# Patient Record
Sex: FEMALE | Race: WHITE | NOT HISPANIC OR LATINO | Employment: STUDENT | URBAN - METROPOLITAN AREA
[De-identification: names, ages, dates, MRNs, and addresses within clinical notes are randomized per-mention and may not be internally consistent; named-entity substitution may affect disease eponyms.]

---

## 2023-07-03 ENCOUNTER — OFFICE VISIT (OUTPATIENT)
Dept: PEDIATRICS | Facility: CLINIC | Age: 23
End: 2023-07-03
Payer: COMMERCIAL

## 2023-07-03 VITALS
HEIGHT: 68 IN | HEART RATE: 77 BPM | SYSTOLIC BLOOD PRESSURE: 116 MMHG | DIASTOLIC BLOOD PRESSURE: 77 MMHG | BODY MASS INDEX: 24.8 KG/M2 | WEIGHT: 163.6 LBS

## 2023-07-03 DIAGNOSIS — Z23 NEED FOR TDAP VACCINATION: Primary | ICD-10-CM

## 2023-07-03 DIAGNOSIS — D23.5 DERMOID CYST OF SKIN OF BACK: ICD-10-CM

## 2023-07-03 DIAGNOSIS — Z01.10 ENCOUNTER FOR HEARING EXAMINATION WITHOUT ABNORMAL FINDINGS: ICD-10-CM

## 2023-07-03 DIAGNOSIS — Z00.129 ENCOUNTER FOR ROUTINE CHILD HEALTH EXAMINATION WITHOUT ABNORMAL FINDINGS: ICD-10-CM

## 2023-07-03 PROBLEM — L85.8 KERATOSIS PILARIS: Status: ACTIVE | Noted: 2023-07-03

## 2023-07-03 PROBLEM — R42 POSITIONAL LIGHTHEADEDNESS: Status: RESOLVED | Noted: 2023-07-03 | Resolved: 2023-07-03

## 2023-07-03 PROCEDURE — 90715 TDAP VACCINE 7 YRS/> IM: CPT | Performed by: PEDIATRICS

## 2023-07-03 PROCEDURE — 92551 PURE TONE HEARING TEST AIR: CPT | Performed by: PEDIATRICS

## 2023-07-03 PROCEDURE — 3008F BODY MASS INDEX DOCD: CPT | Performed by: PEDIATRICS

## 2023-07-03 PROCEDURE — 1036F TOBACCO NON-USER: CPT | Performed by: PEDIATRICS

## 2023-07-03 PROCEDURE — 99395 PREV VISIT EST AGE 18-39: CPT | Performed by: PEDIATRICS

## 2023-07-03 PROCEDURE — 90471 IMMUNIZATION ADMIN: CPT | Performed by: PEDIATRICS

## 2023-07-03 PROCEDURE — 96127 BRIEF EMOTIONAL/BEHAV ASSMT: CPT | Performed by: PEDIATRICS

## 2023-07-03 ASSESSMENT — PATIENT HEALTH QUESTIONNAIRE - PHQ9
SUM OF ALL RESPONSES TO PHQ9 QUESTIONS 1 AND 2: 0
1. LITTLE INTEREST OR PLEASURE IN DOING THINGS: NOT AT ALL
2. FEELING DOWN, DEPRESSED OR HOPELESS: NOT AT ALL

## 2023-07-03 NOTE — PATIENT INSTRUCTIONS
Healthy 22 y.o. female child.  1. Anticipatory guidance discussed. Gave handout on well child issues at this age.  2. Vision done by eye doctor and hearing screen done  3. Vaccines as per orders if needed- TdaP  4. Follow-up visit in 1 year for routine exam with adult health care provider. Also start with gyn.   5. Likely cyst on back- see dermatology. Call if increasing in size, new symptoms  or changing. Family has dermatologist so will plan for appointment this summer.

## 2023-07-03 NOTE — PROGRESS NOTES
"Subjective   Amparo is a 22 y.o. female who presents today for her Health Maintenance and Supervision Exam.    General Health:  Amparo is in good health  Concerns today: spot on back- been there almost 1 year. It doesn't hurt but back does. No pain radiation. \"Just sore\"    Social and Family History:  At home, for summer, in house on campus  Parental support, work/family balance? Yes    Nutrition:  Current diet: balanced, yes dairy    Vitamins?supplements?no      Dental Care:  Amparo has a dental home: Yes  Dental hygiene regularly performed: Yes  Fluoridate water: Yes    Elimination:  Elimination patterns appropriate: Yes  Sleep:  Sleep patterns appropriate: Yes  Sleep problems: No    Behavior/Socialization:  Good relationships with parents and siblings? Yes  Supportive adult relationship? Yes  Permitted to make decisions? Yes  Responsibilities and chores? Yes  Family Meals? Yes  Normal peer relationships? 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  Graduates may. 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  Any educational accommodations:  No  Academically well adjusted: Yes  Performing at grade level: yes  Socially well adjusted:  yes    Activities:  Physical Activity: yes  Limited screen/media use:   Extracurricular Activities/Hobbies/Interests:     Sports Participation Screening:  Pre-sports participation survey questions assessed and passed? Yes    Menstrual Status:  Menarche at age :  Menses: regular  Problems: cramps- ibuprofen    Sexual History:  Dating: no  Sexually Active: no    Drugs:  Tobacco: No  Alcohol: on occ  Uses drugs: No  Mental Health:  Depression Screening: negative  Thoughts of self harm/suicide: No    Risk Assessment:  Additional health risks: no  Safety Assessment:  Safety topics reviewed: yes    Objective   Physical Exam  Gen: Patient is alert and in NAD.   HEENT: Head is NC/AT. PERRL. EOMI. No conjunctival injection present. Fundi are NL; " no esotropia or exotropia. TMs are transparent with good landmarks. Nasopharynx is without significant edema or rhinorrhea. Oropharynx is clear with MMM.   No tonsillar enlargement or exudates present. Good dentition.  Neck: supple; no lymphadenopathy or masses.  CV: RRR, NL S1/S2, no murmurs.    Resp: CTA bilaterally; no wheezes or rhonchi; work of breathing is NL.    Abdomen: soft, non-tender, non-distended; no HSM or masses; positive bowel sounds.   : NL female genitalia, Lito stage 5.   Musculoskeletal: Spine is straight; extremities are warm and dry with full ROM.     Neuro: NL gait, muscle tone, strength, and DTRs.     Skin: some  2 x 2 cm mass lower thoracic area just to left of midline. Nontender, mobile       Assessment/Plan   Healthy 22 y.o. female child.  1. Anticipatory guidance discussed. Gave handout on well child issues at this age.  2. Vision done by eye doctor and hearing screen done  3. Vaccines as per orders if needed- TdaP  4. Follow-up visit in 1 year for routine exam with adult health care provider. Also start with gyn.   5. Likely cyst on back- see dermatology. Call if increasing in size, new symptoms  or changing. Family has dermatologist so will plan for appointment this summer.

## 2024-07-09 ENCOUNTER — LAB (OUTPATIENT)
Dept: LAB | Facility: LAB | Age: 24
End: 2024-07-09
Payer: COMMERCIAL

## 2024-07-09 ENCOUNTER — APPOINTMENT (OUTPATIENT)
Dept: PRIMARY CARE | Facility: CLINIC | Age: 24
End: 2024-07-09
Payer: COMMERCIAL

## 2024-07-09 VITALS
BODY MASS INDEX: 26.07 KG/M2 | TEMPERATURE: 98.6 F | HEART RATE: 68 BPM | WEIGHT: 172 LBS | DIASTOLIC BLOOD PRESSURE: 85 MMHG | HEIGHT: 68 IN | RESPIRATION RATE: 12 BRPM | SYSTOLIC BLOOD PRESSURE: 121 MMHG | OXYGEN SATURATION: 98 %

## 2024-07-09 DIAGNOSIS — R53.83 FATIGUE, UNSPECIFIED TYPE: ICD-10-CM

## 2024-07-09 DIAGNOSIS — Z00.00 ENCOUNTER FOR WELLNESS EXAMINATION IN ADULT: Primary | ICD-10-CM

## 2024-07-09 DIAGNOSIS — Z00.00 ENCOUNTER FOR WELLNESS EXAMINATION IN ADULT: ICD-10-CM

## 2024-07-09 DIAGNOSIS — E66.3 OVERWEIGHT WITH BODY MASS INDEX (BMI) OF 26 TO 26.9 IN ADULT: ICD-10-CM

## 2024-07-09 DIAGNOSIS — D17.1 LIPOMA OF TORSO: ICD-10-CM

## 2024-07-09 PROCEDURE — 85027 COMPLETE CBC AUTOMATED: CPT

## 2024-07-09 PROCEDURE — 36415 COLL VENOUS BLD VENIPUNCTURE: CPT

## 2024-07-09 PROCEDURE — 83036 HEMOGLOBIN GLYCOSYLATED A1C: CPT

## 2024-07-09 PROCEDURE — 3008F BODY MASS INDEX DOCD: CPT | Performed by: FAMILY MEDICINE

## 2024-07-09 PROCEDURE — 82607 VITAMIN B-12: CPT

## 2024-07-09 PROCEDURE — 99203 OFFICE O/P NEW LOW 30 MIN: CPT | Performed by: FAMILY MEDICINE

## 2024-07-09 PROCEDURE — 82306 VITAMIN D 25 HYDROXY: CPT

## 2024-07-09 PROCEDURE — 80053 COMPREHEN METABOLIC PANEL: CPT

## 2024-07-09 PROCEDURE — 1036F TOBACCO NON-USER: CPT | Performed by: FAMILY MEDICINE

## 2024-07-09 PROCEDURE — 84443 ASSAY THYROID STIM HORMONE: CPT

## 2024-07-09 PROCEDURE — 99385 PREV VISIT NEW AGE 18-39: CPT | Performed by: FAMILY MEDICINE

## 2024-07-09 ASSESSMENT — PATIENT HEALTH QUESTIONNAIRE - PHQ9
10. IF YOU CHECKED OFF ANY PROBLEMS, HOW DIFFICULT HAVE THESE PROBLEMS MADE IT FOR YOU TO DO YOUR WORK, TAKE CARE OF THINGS AT HOME, OR GET ALONG WITH OTHER PEOPLE: SOMEWHAT DIFFICULT
2. FEELING DOWN, DEPRESSED OR HOPELESS: SEVERAL DAYS
SUM OF ALL RESPONSES TO PHQ9 QUESTIONS 1 AND 2: 2
1. LITTLE INTEREST OR PLEASURE IN DOING THINGS: SEVERAL DAYS

## 2024-07-09 ASSESSMENT — ENCOUNTER SYMPTOMS
BACK PAIN: 1
UNEXPECTED WEIGHT CHANGE: 1
FATIGUE: 1

## 2024-07-09 NOTE — PROGRESS NOTES
"Subjective   Patient ID: Amparo Ovalle is a 23 y.o. female who presents for an establishing visit.    HPI   Fatigue has been intermittent. Mom has hashimoto's and maternal gma has MS.  Taking MV  Normal appetite  She is keeping routine  Work-ophthalmology tech  No issues with Bms  +stress  Had low iron when younger-heavy menstrual cycles. Eats meat.      Review of Systems   Constitutional:  Positive for fatigue.   Musculoskeletal:  Positive for back pain.   All other systems reviewed and are negative.    Objective   /85 (BP Location: Right arm, Patient Position: Sitting, BP Cuff Size: Adult)   Pulse 68   Temp 37 °C (98.6 °F) (Temporal)   Resp 12   Ht 1.721 m (5' 7.75\")   Wt 78 kg (172 lb)   LMP 07/04/2024   SpO2 98%   BMI 26.35 kg/m²     Physical Exam  Constitutional: Well developed, well nourished, alert and in no acute distress.  Head and Face: Normocephalic, atraumatic.  Eyes: Normal external exam. Pupils equally round and reactive to light with normal accommodation and extraocular movements intact.   ENT: External inspection of ears normal, tympanic membranes visualized and normal. Nasal mucosa, septum, and turbinates normal. Oral mucosa moist, oropharynx clear.   Neck: Supple, no lymphadenopathy or masses. Thyroid not enlarged, no palpable nodules.   Cardiovascular: Regular rate and rhythm, normal S1 and S2, no murmurs, gallops, or rubs. Radial pulses normal. No peripheral edema.   Pulmonary: No respiratory distress, lungs clear to auscultation bilaterally. No wheezes, rhonchi, rales.   Abdomen: Soft, nontender, nondistended, normal bowel sounds. No masses palpated.   Musculoskeletal: Gait normal. Muscle strength/tone normal of all 4 extremities. Normal range of motion of all extremities.   Skin: Warm, well perfused, normal skin turgor and color, moderate soft, fixed lipoma lateral to left side of T11 with mild TTP.   Neurologic: Cranial nerves II-XII grossly intact. Deep tendon reflexes were 2+ " and symmetric. Sensation normal bilaterally.   Psychiatric: Mood calm and affect normal.    Assessment/Plan   Recommendations for women annual wellness exam:   Make sure screenings for cervical and breast cancer are up to date if applicable- pap smears age 43-65-tphkxfbdi with GYN.   Discuss mammogram starting at age 40 or sooner if positive family history of breast cancer   STD screening   Follow a healthy diet (Dash diet, Mediterranean diet)  Exercise 150 min/wk   Maintain healthy weight (BMI < 25)-your BMI is 26.   Do not smoke   Alcohol in moderation (up to 1 drink/day)  Get enough sleep (7-8 hours/night)  Take a prenatal vitamin with folic acid if possibility of pregnancy   Make sure immunizations are up to date (influenza, Tdap)-up to date.  Premenopausal women need minimum 1,000 mg calcium and 600-800 IU vitamin D daily (combination of diet + supplement)  Talk to your physician if you have concerns about depression or anxiety  Visit dentist twice yearly  Colon Cancer Screening-n/a    Referral to Dr.Linz quispe (general surgeon) for lipoma evaluation    Non-fasting labs ordered to assess fatigue  Please keep routine throughout the day and keep a similar sleep schedule  Drink plenty of water and follow a healthy diet  Exercise as tolerate 5-6 days/week      Follow up in 1 year for CPE or sooner if needed

## 2024-07-09 NOTE — PROGRESS NOTES
"Subjective   Patient ID: Amparo Ovalle is a 23 y.o. female who presents for Annual Exam.    HPI   Fatigue  Fhx of thyroid issues  +weight gain - 20 pounds in the last year  Just graduated college  Still feeling fatigued after sleeping 8 hours, sleeping through the night, falls asleep with no issues    Lipoma  L mid back  Pediatrician saw it first, referred to derm and they wouldn't take it out because it's too close to spine  Reports it may be causing back pain  Noticed it 1.5 year ago  Denies growth in size    Diet: dairy free, well balanced  Exercise: walk/run around neighborhood  S/V: MV  Caffeine: 1 cup coffee day  Alcohol: occasional in school, not currently  Sleep: see above  Tobacco/Nicotine: no  Pap: last July  Menstrual cycles: regular  Contraception: none  Dentist: routine  Eye: regular, next month      Review of Systems   Constitutional:  Positive for fatigue and unexpected weight change.   All other systems reviewed and are negative.      Objective   /85 (BP Location: Right arm, Patient Position: Sitting, BP Cuff Size: Adult)   Pulse 68   Temp 37 °C (98.6 °F) (Temporal)   Resp 12   Ht 1.721 m (5' 7.75\")   Wt 78 kg (172 lb)   LMP 07/04/2024   SpO2 98%   BMI 26.35 kg/m²     Physical Exam  HENT:      Right Ear: Tympanic membrane, ear canal and external ear normal.      Left Ear: Tympanic membrane, ear canal and external ear normal.      Mouth/Throat:      Mouth: Mucous membranes are moist.   Cardiovascular:      Pulses: Normal pulses.      Heart sounds: Normal heart sounds.   Pulmonary:      Effort: Pulmonary effort is normal.      Breath sounds: Normal breath sounds.   Abdominal:      General: Bowel sounds are normal.   Neurological:      Mental Status: She is alert.   Psychiatric:         Mood and Affect: Mood normal.         Behavior: Behavior normal.         Assessment/Plan          "

## 2024-07-10 LAB
25(OH)D3 SERPL-MCNC: 31 NG/ML (ref 30–100)
ALBUMIN SERPL BCP-MCNC: 4.7 G/DL (ref 3.4–5)
ALP SERPL-CCNC: 53 U/L (ref 33–110)
ALT SERPL W P-5'-P-CCNC: 18 U/L (ref 7–45)
ANION GAP SERPL CALC-SCNC: 12 MMOL/L (ref 10–20)
AST SERPL W P-5'-P-CCNC: 20 U/L (ref 9–39)
BILIRUB SERPL-MCNC: 0.2 MG/DL (ref 0–1.2)
BUN SERPL-MCNC: 15 MG/DL (ref 6–23)
CALCIUM SERPL-MCNC: 9.9 MG/DL (ref 8.6–10.6)
CHLORIDE SERPL-SCNC: 101 MMOL/L (ref 98–107)
CO2 SERPL-SCNC: 29 MMOL/L (ref 21–32)
CREAT SERPL-MCNC: 0.64 MG/DL (ref 0.5–1.05)
EGFRCR SERPLBLD CKD-EPI 2021: >90 ML/MIN/1.73M*2
ERYTHROCYTE [DISTWIDTH] IN BLOOD BY AUTOMATED COUNT: 12.2 % (ref 11.5–14.5)
EST. AVERAGE GLUCOSE BLD GHB EST-MCNC: 94 MG/DL
GLUCOSE SERPL-MCNC: 89 MG/DL (ref 74–99)
HBA1C MFR BLD: 4.9 %
HCT VFR BLD AUTO: 37.7 % (ref 36–46)
HGB BLD-MCNC: 12.4 G/DL (ref 12–16)
MCH RBC QN AUTO: 28.4 PG (ref 26–34)
MCHC RBC AUTO-ENTMCNC: 32.9 G/DL (ref 32–36)
MCV RBC AUTO: 86 FL (ref 80–100)
NRBC BLD-RTO: 0 /100 WBCS (ref 0–0)
PLATELET # BLD AUTO: 289 X10*3/UL (ref 150–450)
POTASSIUM SERPL-SCNC: 4.1 MMOL/L (ref 3.5–5.3)
PROT SERPL-MCNC: 6.9 G/DL (ref 6.4–8.2)
RBC # BLD AUTO: 4.37 X10*6/UL (ref 4–5.2)
SODIUM SERPL-SCNC: 138 MMOL/L (ref 136–145)
TSH SERPL-ACNC: 1.99 MIU/L (ref 0.44–3.98)
VIT B12 SERPL-MCNC: 745 PG/ML (ref 211–911)
WBC # BLD AUTO: 7.2 X10*3/UL (ref 4.4–11.3)

## 2024-07-10 NOTE — RESULT ENCOUNTER NOTE
Kidney function, glucose, electrolytes, and liver function are normal.    Normal B12 level    Thyroid function normal    Normal vitamin D    Normal A1c, no diabetes    Blood counts are normal including white blood cells and platelets and there is no anemia.

## 2024-07-16 NOTE — PROGRESS NOTES
"History Of Present Illness :  Amparo Ovalle is a healthy 23 y.o. female who presents on referral from Dr. Henny Georges for evaluation of a soft tissue mass.  The patient was recently seen by Dr. Georges on 7/9/2024 to establish care.  examination at that time revealed: \"Skin: Warm, well perfused, normal skin turgor and color, moderate soft, fixed lipoma lateral to left side of T11 with mild TTP.\"    The patient notes a 2-year history of a small subcutaneous soft tissue mass just to the left of the mid spine.  She first noticed this on self-exam.  She notes some mild associated soreness.  There is been only some minimal enlargement over time.  This has been noticed by previous physicians including a dermatologist and her previous pediatrician.  She has had no similar symptoms or lesions in the past.    The patient presents with her mother, Francesca.  She lives with her parents in [place].  She is a recent premed graduate from the [employer].  She is taking a gap year before she applies to med school.  She is currently working as a tech in an ophthalmology office.    Past Medical History   Past Medical History:   Diagnosis Date    Personal history of other diseases of the respiratory system 03/23/2015    History of streptococcal pharyngitis        Surgical History    Past Surgical History:   Procedure Laterality Date    WISDOM TOOTH EXTRACTION  August 2018        Allergies   Allergies   Allergen Reactions    Lactose Hives        Home Meds  No current outpatient medications     Family History    Family History   Problem Relation Name Age of Onset    Hyperlipidemia Mother Francesca     Hypertension Mother Francesca     Asthma Father Carlos     Heart disease Maternal Grandfather Bruno     Hyperlipidemia Maternal Grandfather Bruno     Hypertension Maternal Grandfather Bruno     Stroke Maternal Grandfather Bruno     COPD Paternal Grandfather Carlos     Heart disease Paternal Grandfather Carlos     Asthma Sister Sandra     Asthma Brother " Delonte     Asthma Brother Anupam         Social History  Social History     Tobacco Use    Smoking status: Never     Passive exposure: Never    Smokeless tobacco: Never   Substance Use Topics    Alcohol use: Not Currently    Drug use: Never        Review Of Systems    Review of Systems    General: Not Present- Obesity, Cancer, HIV, MRSA, Recent Cold/Flu, Tired During the Day and VRE.  HEENT: Not Present- Migraine, Cataracts, Glaucoma, Macular Degeneration and Retinal Detachment.  Respiratory: Not Present- Asthma, Chronic Cough, Difficulty Breathing on Exertion, Difficulty Breathing at Rest, Emphysema, Frequent Bronchitis, Home CPAP/BiPAP, Home Oxygen, Pulmonary Embolus, Pneumonia/TB, Sleep Apnea and Snoring.  Cardiovascular: Not Present- Chest Pain, Congestive Heart Failure, Heart Attack, Coronary Artery Disease, Heart Stent, High Cholesterol/Lipids, Hypertension, Internal Defibrillator, Irregular Heart Beat, Mitral Valve Prolapse, Murmur, Pacemaker and Peripheral Vascular Disease.  Gastrointestinal: Not Present- Heartburn, Hepatitis, Hiatal Hernia, Jaundice, Reflux, Stomach Ulcer and IBS.  Female Genitourinary: Not Present- Kidney Failure, Kidney Stones, Dialysis and Urinary Tract Infection.  Musculoskeletal: Not Present- Arthritis, Back Pain and Fibromyalgia.  Neurological: Not Present- Headaches, Numbness, Tingling, Seizures, Stroke,  Shunt and Weakness.  Psychiatric: Not Present- Anxiety, Bipolar, Depression and Panic Attacks.  Endocrine: Not Present- Diabetes, Hyperthyroidism, Hypothyroidism and Low Blood Sugar.  Hematology: Not Present- Abnormal Bleeding, Anemia and Blood Clots.    Vitals  There were no vitals taken for this visit.     Physical Exam   Skin & Soft Tissue Exam    Integumentary  Global Assessment: Examination of related systems reveals - Well-developed, well-nourished and in no acute distress; alert and oriented x 3,  Neck supple, with no palpable masses, no thyromegaly, No lymphadenopathy  and Apocrine and  eccrine glands are normal with no hyperhidrosis.   Upon inspection and palpation of skin surfaces of the - Head/Face: no rashes, ulcers, lesions or evidence of photo damage. No palpable nodules or masses, Neck: no visible lesions or palpable masses, Chest: no swelling,scarring or lesions. No prominent arteries or veins observed, Axillae: non-tender, no inflammation or ulceration, no drainage., Abdomen: no scars, rashes or lesions, Back: normal skin surface, no evidence of  photo damage, no suspicious lesions, Right upper extremity: no lesions or rashes. No evidence of photo damage, Left upper extremity: no lesions or rashes. No evidence of photo damage, Right lower extremity: no stasis ulcers, rashes or suspicious lesions. No evidence of photo damage, Left lower extremity: no stasis ulcers, rashes or suspicious lesions. No evidence of  photo damage, Distribution of scalp and body hair is normal and No dandruff or other scalp lesions noted.    Back: The patient was examined upright and in the prone position; just to the left of the spine at roughly T11/12, there is a transversely oriented relatively well-defined 2.5 x 1.5 cm  nontender subcutaneous mass consistent with lipoma.    Chest and Lung Exam  Chest and lung exam reveals - normal excursion with symmetric chest walls and on auscultation, normal breath sounds, no  adventitious sounds and normal vocal resonance.    Cardiovascular  Cardiovascular examination reveals - normal heart sounds, regular rate and rhythm with no murmurs.    Assessment/Plan   Ms. Ovalle has a small soft tissue mass just to the left of the spine at T11/12 consistent with a subcutaneous lipoma.    Recommendations:    Options were explained to the patient including expectant management versus excision.  Indications for excision typically include unclear diagnosis, enlargement, or symptoms.    Excision will be under local anesthesia as an outpatient at [facility].  The CPT code  would be 34687.    The patient will consider, and let us know if she would like to proceed.    If this is to be followed expectantly, the patient return if there is any change in size or character.

## 2024-07-17 ENCOUNTER — APPOINTMENT (OUTPATIENT)
Dept: SURGERY | Facility: CLINIC | Age: 24
End: 2024-07-17
Payer: COMMERCIAL

## 2024-07-17 VITALS
TEMPERATURE: 98.4 F | SYSTOLIC BLOOD PRESSURE: 136 MMHG | HEIGHT: 68 IN | OXYGEN SATURATION: 96 % | BODY MASS INDEX: 26.49 KG/M2 | WEIGHT: 174.8 LBS | RESPIRATION RATE: 16 BRPM | DIASTOLIC BLOOD PRESSURE: 96 MMHG | HEART RATE: 82 BPM

## 2024-07-17 DIAGNOSIS — D17.1 LIPOMA OF TORSO: ICD-10-CM

## 2024-07-17 PROCEDURE — 99203 OFFICE O/P NEW LOW 30 MIN: CPT | Performed by: SURGERY

## 2024-07-17 PROCEDURE — 3008F BODY MASS INDEX DOCD: CPT | Performed by: SURGERY

## 2024-07-17 PROCEDURE — 1036F TOBACCO NON-USER: CPT | Performed by: SURGERY

## 2024-07-17 ASSESSMENT — PAIN SCALES - GENERAL: PAINLEVEL: 0-NO PAIN

## 2024-08-08 ENCOUNTER — PREP FOR PROCEDURE (OUTPATIENT)
Dept: SURGERY | Facility: HOSPITAL | Age: 24
End: 2024-08-08
Payer: COMMERCIAL

## 2024-08-08 DIAGNOSIS — R22.9 SUBCUTANEOUS MASS: Primary | ICD-10-CM

## 2024-08-23 ENCOUNTER — HOSPITAL ENCOUNTER (OUTPATIENT)
Facility: HOSPITAL | Age: 24
Setting detail: OUTPATIENT SURGERY
Discharge: HOME | End: 2024-08-23
Attending: SURGERY | Admitting: SURGERY
Payer: COMMERCIAL

## 2024-08-23 VITALS
HEART RATE: 85 BPM | OXYGEN SATURATION: 100 % | HEIGHT: 67 IN | RESPIRATION RATE: 16 BRPM | BODY MASS INDEX: 27.44 KG/M2 | WEIGHT: 174.82 LBS | SYSTOLIC BLOOD PRESSURE: 121 MMHG | TEMPERATURE: 98.2 F | DIASTOLIC BLOOD PRESSURE: 78 MMHG

## 2024-08-23 DIAGNOSIS — R22.9 SUBCUTANEOUS MASS: Primary | ICD-10-CM

## 2024-08-23 PROCEDURE — 88304 TISSUE EXAM BY PATHOLOGIST: CPT | Mod: TC,PARLAB | Performed by: SURGERY

## 2024-08-23 PROCEDURE — 3600000003 HC OR TIME - INITIAL BASE CHARGE - PROCEDURE LEVEL THREE: Performed by: SURGERY

## 2024-08-23 PROCEDURE — 2500000004 HC RX 250 GENERAL PHARMACY W/ HCPCS (ALT 636 FOR OP/ED): Mod: JZ | Performed by: SURGERY

## 2024-08-23 PROCEDURE — 7100000010 HC PHASE TWO TIME - EACH INCREMENTAL 1 MINUTE: Performed by: SURGERY

## 2024-08-23 PROCEDURE — 3600000008 HC OR TIME - EACH INCREMENTAL 1 MINUTE - PROCEDURE LEVEL THREE: Performed by: SURGERY

## 2024-08-23 PROCEDURE — 2500000005 HC RX 250 GENERAL PHARMACY W/O HCPCS: Performed by: SURGERY

## 2024-08-23 PROCEDURE — 2720000007 HC OR 272 NO HCPCS: Performed by: SURGERY

## 2024-08-23 PROCEDURE — 7100000009 HC PHASE TWO TIME - INITIAL BASE CHARGE: Performed by: SURGERY

## 2024-08-23 PROCEDURE — 21931 EXC BACK LES SC 3 CM/>: CPT | Performed by: SURGERY

## 2024-08-23 RX ORDER — BUPIVACAINE HYDROCHLORIDE 5 MG/ML
INJECTION, SOLUTION EPIDURAL; INTRACAUDAL AS NEEDED
Status: DISCONTINUED | OUTPATIENT
Start: 2024-08-23 | End: 2024-08-23 | Stop reason: HOSPADM

## 2024-08-23 RX ORDER — LIDOCAINE HYDROCHLORIDE 10 MG/ML
INJECTION INFILTRATION; PERINEURAL AS NEEDED
Status: DISCONTINUED | OUTPATIENT
Start: 2024-08-23 | End: 2024-08-23 | Stop reason: HOSPADM

## 2024-08-23 ASSESSMENT — PAIN - FUNCTIONAL ASSESSMENT: PAIN_FUNCTIONAL_ASSESSMENT: 0-10

## 2024-08-23 ASSESSMENT — PAIN SCALES - GENERAL: PAINLEVEL_OUTOF10: 0 - NO PAIN

## 2024-08-23 NOTE — OP NOTE
LEFT MID BACK SUBCUTANEOUS MASS EXCISION (L) Operative Note     Date: 2024  OR Location: [facility]    Name: Amparo Ovalle, : 2000, Age: 23 y.o., MRN: [id number], Sex: female    Diagnosis  Pre-op Diagnosis      * Subcutaneous mass [R22.9] Post-op Diagnosis     * Subcutaneous mass [R22.9]     Procedures    Excision of subcutaneous mass, back  (> 3 cm, CPT 49755)      Surgeons      * Yon Jansen - Primary    Resident/Fellow/Other Assistant:  GENESIS Guzman    Procedure Summary  Anesthesia: Local with a total of 23 cc of a 50-50 mixture of 1% lidocaine and 0.5% Marcaine with epinephrine    ASA: ASA status not filed in the log.  Anesthesia Staff: No anesthesia staff entered.  Estimated Blood Loss: Less than 2 mL  Intra-op Medications: * Intraprocedure medication information is unavailable because the case start and end events have not been set *      Intraprocedure I/O Totals       None           Specimen:   ID Type Source Tests Collected by Time   1 : left lower back soft tissue mass Tissue SOFT TISSUE MASS RESECTION SURGICAL PATHOLOGY EXAM Yon Jansen MD 2024 1332        Staff:   Relief Circulator: Mary Beth Nassar Scrub: Daphne  Scrub Person: Elvin  Circulator: Varsha  Circulator: Omar         Drains and/or Catheters: * None in log *    Tourniquet Times:         Implants:     Findings: See below    Indications: Amparo Ovalle is an 23 y.o. female who is having surgery for Subcutaneous mass [R22.9].     The patient was seen in the preoperative area. The risks, benefits, complications, treatment options, non-operative alternatives, expected recovery and outcomes were discussed with the patient. The possibilities of reaction to medication, pulmonary aspiration, injury to surrounding structures, bleeding, recurrent infection, the need for additional procedures, failure to diagnose a condition, and creating a complication requiring transfusion or operation were discussed with the patient. The patient  concurred with the proposed plan, giving informed consent.  The site of surgery was properly noted/marked if necessary per policy. The patient has been actively warmed in preoperative area. Preoperative antibiotics are not indicated. Venous thrombosis prophylaxis is not indicated.    Procedure Details:     Findings:   In the left lower back, mid scapular line, at the level roughly T12, there was a subcutaneous transversely oriented deep subcutaneous mass; this was unilobular and fatty and encapsulated measured 36 x 34 x 8 mm.    Specimens:  Subcutaneous mass, left lower back    Procedure:    The patient was taken to the operating room placed on the table in the prone position.  Preoperative huddle was accomplished with the OR team and the patient.  The left lower was prepared and draped in normal sterile fashion. The surgical site was marked by the surgeon preoperatively. After the appropriate timeout, the case commenced.    A 4 cm transverse incision was marked directly over the mass. The skin and subcutaneous tissues were anesthetized using the above-noted local infiltrative anesthesia. The skin and SQ tissues were divided down to the mass. The fatty mass was then grasped, elevated, and circumferentially and sharply dissected free, removed in toto, and sent to pathology as a specimen. Hemostasis was obtained using direct pressure and minimal electrocautery. The subcutaneous tissues were then approximated using interrupted 3-0 Vicryl sutures. The skin was then approximated using a running 4-0 undyed subcuticular Vicryl suture. The wound was cleaned and dry, benzoin and Steri-Strips were placed, followed by a dry sterile bio-occlusive dressing.    The patient tolerated the procedure well. Sponge, needle, and instrument counts were correct times 2.  EBL was < 2 cc.  The patient was taken to the post-operative holding area with stable vital signs and in excellent condition.    Yon Jansen M.D.  Complications:  None;  patient tolerated the procedure well.    Disposition: PACU - hemodynamically stable.  Condition: stable         Additional Details: none    Attending Attestation: I performed the procedure.    Yon Jansen  Phone Number: [phone]

## 2024-08-23 NOTE — H&P
"Preoperative History and Physical     History Of Present Illness    The patient presents for her operation.  She was seen by me on 7/17/2024.  Please see that note for details.  She has noted no change in the size or character of the lesion since her initial visit.  She has experienced no new medical problems.    7/17/2024:  Amparo Ovalle is a healthy 23 y.o. female who presents on referral from Dr. Henny Georges for evaluation of a soft tissue mass.  The patient was recently seen by Dr. Georges on 7/9/2024 to establish care.  examination at that time revealed: \"Skin: Warm, well perfused, normal skin turgor and color, moderate soft, fixed lipoma lateral to left side of T11 with mild TTP.\"     The patient notes a 2-year history of a small subcutaneous soft tissue mass just to the left of the mid spine.  She first noticed this on self-exam.  She notes some mild associated soreness.  There is been only some minimal enlargement over time.  This has been noticed by previous physicians including a dermatologist and her previous pediatrician.  She has had no similar symptoms or lesions in the past.     The patient presents with her mother, Francesca.  She lives with her parents in [place].  She is a recent premed graduate from the [employer].  She is taking a gap year before she applies to med school.  She is currently working as a tech in an ophthalmology office.     Past Medical History   Medical History        Past Medical History:   Diagnosis Date    Personal history of other diseases of the respiratory system 03/23/2015     History of streptococcal pharyngitis            Surgical History    Surgical History         Past Surgical History:   Procedure Laterality Date    WISDOM TOOTH EXTRACTION   August 2018            Allergies   RX Allergies        Allergies   Allergen Reactions    Lactose Hives            Home Meds  No current outpatient medications      Family History    Family History          Family History "   Problem Relation Name Age of Onset    Hyperlipidemia Mother Francesca      Hypertension Mother Francesca      Asthma Father Carlos      Heart disease Maternal Grandfather Bruno      Hyperlipidemia Maternal Grandfather Bruno      Hypertension Maternal Grandfather Bruno      Stroke Maternal Grandfather Bruno      COPD Paternal Grandfather Carlos      Heart disease Paternal Grandfather Carlos      Asthma Sister Sandra      Asthma Brother Delonte      Asthma Brother Anupam              Social History  Social History   Social History            Tobacco Use    Smoking status: Never       Passive exposure: Never    Smokeless tobacco: Never   Substance Use Topics    Alcohol use: Not Currently    Drug use: Never            Review Of Systems    Review of Systems     General: Not Present- Obesity, Cancer, HIV, MRSA, Recent Cold/Flu, Tired During the Day and VRE.  HEENT: Not Present- Migraine, Cataracts, Glaucoma, Macular Degeneration and Retinal Detachment.  Respiratory: Not Present- Asthma, Chronic Cough, Difficulty Breathing on Exertion, Difficulty Breathing at Rest, Emphysema, Frequent Bronchitis, Home CPAP/BiPAP, Home Oxygen, Pulmonary Embolus, Pneumonia/TB, Sleep Apnea and Snoring.  Cardiovascular: Not Present- Chest Pain, Congestive Heart Failure, Heart Attack, Coronary Artery Disease, Heart Stent, High Cholesterol/Lipids, Hypertension, Internal Defibrillator, Irregular Heart Beat, Mitral Valve Prolapse, Murmur, Pacemaker and Peripheral Vascular Disease.  Gastrointestinal: Not Present- Heartburn, Hepatitis, Hiatal Hernia, Jaundice, Reflux, Stomach Ulcer and IBS.  Female Genitourinary: Not Present- Kidney Failure, Kidney Stones, Dialysis and Urinary Tract Infection.  Musculoskeletal: Not Present- Arthritis, Back Pain and Fibromyalgia.  Neurological: Not Present- Headaches, Numbness, Tingling, Seizures, Stroke,  Shunt and Weakness.  Psychiatric: Not Present- Anxiety, Bipolar, Depression and Panic Attacks.  Endocrine: Not Present- Diabetes,  Hyperthyroidism, Hypothyroidism and Low Blood Sugar.  Hematology: Not Present- Abnormal Bleeding, Anemia and Blood Clots.     Vitals  There were no vitals taken for this visit.      Physical Exam   Skin & Soft Tissue Exam     Integumentary  Global Assessment: Examination of related systems reveals - Well-developed, well-nourished and in no acute distress; alert and oriented x 3,  Neck supple, with no palpable masses, no thyromegaly, No lymphadenopathy and Apocrine and  eccrine glands are normal with no hyperhidrosis.   Upon inspection and palpation of skin surfaces of the - Head/Face: no rashes, ulcers, lesions or evidence of photo damage. No palpable nodules or masses, Neck: no visible lesions or palpable masses, Chest: no swelling,scarring or lesions. No prominent arteries or veins observed, Axillae: non-tender, no inflammation or ulceration, no drainage., Abdomen: no scars, rashes or lesions, Back: normal skin surface, no evidence of  photo damage, no suspicious lesions, Right upper extremity: no lesions or rashes. No evidence of photo damage, Left upper extremity: no lesions or rashes. No evidence of photo damage, Right lower extremity: no stasis ulcers, rashes or suspicious lesions. No evidence of photo damage, Left lower extremity: no stasis ulcers, rashes or suspicious lesions. No evidence of  photo damage, Distribution of scalp and body hair is normal and No dandruff or other scalp lesions noted.     Back: The patient was examined upright and in the prone position; just to the left of the spine at roughly T11/12, there is a transversely oriented relatively well-defined 2.5 x 1.5 cm  nontender subcutaneous mass consistent with lipoma.     Chest and Lung Exam  Chest and lung exam reveals - normal excursion with symmetric chest walls and on auscultation, normal breath sounds, no  adventitious sounds and normal vocal resonance.     Cardiovascular  Cardiovascular examination reveals - normal heart sounds, regular  rate and rhythm with no murmurs.     Assessment/Plan   Ms. Ovalle has a small soft tissue mass just to the left of the spine at T11/12 consistent with a subcutaneous lipoma.     Recommendations:     In order to relieve symptoms and establish a diagnosis, excision of this soft tissue mass is indicated and recommended.    Will proceed today under local anesthesia at [facility].     Patient will see me for a postoperative visit on Tuesday, 9/3/2024 at my [facility] office.    Skin and Soft Tissue Consent: The procedure was explained to the patient in detail, including the risks, benefits and alternatives. The risks include, but not limited to, infection, bleeding, hematoma, seroma, nerve injury resulting in motor or sensory deficit/disability, recurrence, prolonged wound healing and the need for further surgery.  The patient agreed with the plan and signed the electronic consent.

## 2024-08-23 NOTE — DISCHARGE INSTRUCTIONS
For postoperative analgesia/pain relief, I recommend:     Tylenol Extra Strength 500 mg with ibuprofen 400 - 600 mg ( two or three, 200 mg Advil or Motrin tablets ) 4 times a day with food, on schedule, for 1-2 days, then as needed thereafter.

## 2024-08-26 ASSESSMENT — PAIN SCALES - GENERAL: PAINLEVEL_OUTOF10: 0 - NO PAIN

## 2024-09-02 NOTE — PROGRESS NOTES
Post-Operative Office Visit  Amparo Ovalle presents for a postoperative visit.    On 8/23/2024, the patient underwent the following procedure:    Excision of subcutaneous mass, back  (> 3 cm, CPT 70730)    Pathology is pending.    Unremarkable postoperative course.  She took the Tylenol ibuprofen combination times the first 2 days and then ibuprofen 2 days thereafter.  She is taken no analgesics for more than a week.  No issues with the incision.      Vitals  There were no vitals taken for this visit.     Exam    Post Op General Physical Exam    The physical exam findings are as follows:    Integumentary    Incision -3.5 cm transverse incision left lower back, mid scapular line, at the level roughly T12 -  Dry, Intact, No evidence of infection, hematoma, or seroma, edges well-approximated.  No drainage present, no ecchymosis, no redness and no warmth to the touch.      Assessment and Plan    Amparo has done very well from her recent operation.    Recommendations:    No restrictions    May massage moisturizing cream ointment or lotion along incision daily after showering for several weeks to help soften    Will call patient with pathology result    Follow-up as needed    Addendum 9/3/2024 1136    Pathology returned and revealed:    Left lower back mass, excision:  -- Mature adipose tissue consistent with lipoma (3.3 cm).    I called the patient and relayed the results personally.

## 2024-09-03 ENCOUNTER — APPOINTMENT (OUTPATIENT)
Dept: SURGERY | Facility: CLINIC | Age: 24
End: 2024-09-03
Payer: COMMERCIAL

## 2024-09-03 VITALS
OXYGEN SATURATION: 98 % | SYSTOLIC BLOOD PRESSURE: 123 MMHG | WEIGHT: 176 LBS | HEIGHT: 67 IN | BODY MASS INDEX: 27.62 KG/M2 | DIASTOLIC BLOOD PRESSURE: 80 MMHG | RESPIRATION RATE: 17 BRPM

## 2024-09-03 DIAGNOSIS — D17.1 LIPOMA OF TORSO: Primary | ICD-10-CM

## 2024-09-03 LAB
LABORATORY COMMENT REPORT: NORMAL
PATH REPORT.FINAL DX SPEC: NORMAL
PATH REPORT.GROSS SPEC: NORMAL
PATH REPORT.RELEVANT HX SPEC: NORMAL
PATH REPORT.TOTAL CANCER: NORMAL

## 2024-09-03 PROCEDURE — 1036F TOBACCO NON-USER: CPT | Performed by: SURGERY

## 2024-09-03 PROCEDURE — 3008F BODY MASS INDEX DOCD: CPT | Performed by: SURGERY

## 2024-09-03 PROCEDURE — 99024 POSTOP FOLLOW-UP VISIT: CPT | Performed by: SURGERY

## 2025-06-17 ASSESSMENT — PROMIS GLOBAL HEALTH SCALE
RATE_MENTAL_HEALTH: GOOD
RATE_QUALITY_OF_LIFE: GOOD
CARRYOUT_SOCIAL_ACTIVITIES: VERY GOOD
RATE_AVERAGE_PAIN: 0
EMOTIONAL_PROBLEMS: RARELY
RATE_GENERAL_HEALTH: GOOD
RATE_PHYSICAL_HEALTH: GOOD
RATE_AVERAGE_FATIGUE: MILD
CARRYOUT_PHYSICAL_ACTIVITIES: COMPLETELY
RATE_SOCIAL_SATISFACTION: GOOD

## 2025-06-18 ENCOUNTER — APPOINTMENT (OUTPATIENT)
Dept: PRIMARY CARE | Facility: CLINIC | Age: 25
End: 2025-06-18
Payer: COMMERCIAL

## 2025-06-18 VITALS
TEMPERATURE: 98.4 F | SYSTOLIC BLOOD PRESSURE: 126 MMHG | WEIGHT: 172.9 LBS | HEART RATE: 109 BPM | RESPIRATION RATE: 14 BRPM | BODY MASS INDEX: 26.21 KG/M2 | DIASTOLIC BLOOD PRESSURE: 83 MMHG | HEIGHT: 68 IN | OXYGEN SATURATION: 98 %

## 2025-06-18 DIAGNOSIS — Z11.1 TUBERCULOSIS SCREENING: ICD-10-CM

## 2025-06-18 DIAGNOSIS — E66.3 OVERWEIGHT WITH BODY MASS INDEX (BMI) OF 26 TO 26.9 IN ADULT: ICD-10-CM

## 2025-06-18 DIAGNOSIS — R53.83 FATIGUE, UNSPECIFIED TYPE: ICD-10-CM

## 2025-06-18 DIAGNOSIS — G47.19 DAYTIME HYPERSOMNOLENCE: ICD-10-CM

## 2025-06-18 DIAGNOSIS — M54.50 CHRONIC MIDLINE LOW BACK PAIN WITHOUT SCIATICA: ICD-10-CM

## 2025-06-18 DIAGNOSIS — Z01.84 IMMUNITY STATUS TESTING: ICD-10-CM

## 2025-06-18 DIAGNOSIS — Z00.00 ENCOUNTER FOR WELLNESS EXAMINATION IN ADULT: Primary | ICD-10-CM

## 2025-06-18 DIAGNOSIS — G89.29 CHRONIC MIDLINE LOW BACK PAIN WITHOUT SCIATICA: ICD-10-CM

## 2025-06-18 PROCEDURE — 99395 PREV VISIT EST AGE 18-39: CPT | Performed by: FAMILY MEDICINE

## 2025-06-18 PROCEDURE — 99214 OFFICE O/P EST MOD 30 MIN: CPT | Performed by: FAMILY MEDICINE

## 2025-06-18 PROCEDURE — 1036F TOBACCO NON-USER: CPT | Performed by: FAMILY MEDICINE

## 2025-06-18 PROCEDURE — 3008F BODY MASS INDEX DOCD: CPT | Performed by: FAMILY MEDICINE

## 2025-06-18 RX ORDER — METHOCARBAMOL 500 MG/1
500 TABLET, FILM COATED ORAL NIGHTLY PRN
Qty: 5 TABLET | Refills: 0 | Status: SHIPPED | OUTPATIENT
Start: 2025-06-18 | End: 2025-06-23

## 2025-06-18 ASSESSMENT — PATIENT HEALTH QUESTIONNAIRE - PHQ9
3. TROUBLE FALLING OR STAYING ASLEEP: NOT AT ALL
9. THOUGHTS THAT YOU WOULD BE BETTER OFF DEAD, OR OF HURTING YOURSELF: NOT AT ALL
5. POOR APPETITE OR OVEREATING: NOT AT ALL
2. FEELING DOWN, DEPRESSED OR HOPELESS: NOT AT ALL
8. MOVING OR SPEAKING SO SLOWLY THAT OTHER PEOPLE COULD HAVE NOTICED. OR THE OPPOSITE, BEING SO FIGETY OR RESTLESS THAT YOU HAVE BEEN MOVING AROUND A LOT MORE THAN USUAL: NOT AT ALL
6. FEELING BAD ABOUT YOURSELF - OR THAT YOU ARE A FAILURE OR HAVE LET YOURSELF OR YOUR FAMILY DOWN: NOT AT ALL
1. LITTLE INTEREST OR PLEASURE IN DOING THINGS: NOT AT ALL
7. TROUBLE CONCENTRATING ON THINGS, SUCH AS READING THE NEWSPAPER OR WATCHING TELEVISION: SEVERAL DAYS
4. FEELING TIRED OR HAVING LITTLE ENERGY: NEARLY EVERY DAY
SUM OF ALL RESPONSES TO PHQ QUESTIONS 1-9: 4
SUM OF ALL RESPONSES TO PHQ9 QUESTIONS 1 & 2: 0

## 2025-06-18 ASSESSMENT — ANXIETY QUESTIONNAIRES
6. BECOMING EASILY ANNOYED OR IRRITABLE: SEVERAL DAYS
7. FEELING AFRAID AS IF SOMETHING AWFUL MIGHT HAPPEN: NOT AT ALL
4. TROUBLE RELAXING: SEVERAL DAYS
2. NOT BEING ABLE TO STOP OR CONTROL WORRYING: SEVERAL DAYS
IF YOU CHECKED OFF ANY PROBLEMS ON THIS QUESTIONNAIRE, HOW DIFFICULT HAVE THESE PROBLEMS MADE IT FOR YOU TO DO YOUR WORK, TAKE CARE OF THINGS AT HOME, OR GET ALONG WITH OTHER PEOPLE: NOT DIFFICULT AT ALL
1. FEELING NERVOUS, ANXIOUS, OR ON EDGE: MORE THAN HALF THE DAYS
3. WORRYING TOO MUCH ABOUT DIFFERENT THINGS: SEVERAL DAYS
GAD7 TOTAL SCORE: 6
5. BEING SO RESTLESS THAT IT IS HARD TO SIT STILL: NOT AT ALL

## 2025-06-18 NOTE — PROGRESS NOTES
Subjective   Patient ID: Amparo Ovalle is a 24 y.o. female who presents for a wellness examination and completion of forms.  The patient was made aware that AI (artificial intelligence) technology will be utilized during today's visit. The patient expressed understanding and verbally accepts the use of AI technology.     HPI   Pap smear: 8/2/2023 via GYN (negative)  PHQ-9: 4- denies depression  SHYLA-7: 6-admits to anxiety but is manageable without medication    History of Present Illness  Amparo Ovalle is a 24 year old female who presents with low back pain and fatigue.    She experiences persistent low back discomfort and pain, described as tightness and soreness located above her hip bones and along her spine. A lipoma was removed last year, providing temporary relief for about six weeks. The pain has since returned and is exacerbated by poor posture and physical activities such as running. Exercises suggested by a friend in physical therapy have provided some relief, and chiropractic care has offered temporary relief. Her pain is worse when bending forward. She feels sore when she wakes up from sleep.     She experiences significant fatigue despite adequate sleep (8hrs/night). She sleeps well through the night but wakes up feeling tired, often needing to lie in bed for an additional thirty minutes after her alarm goes off. She feels tired throughout the morning, improving in the afternoon. She consumes a double shot of coffee in the morning and occasionally more caffeine in the afternoon if needed. Previous blood work in July was normal. No family history of narcolepsy. She shares a room with her sister, who has not reported any sleep disturbances such as snoring.    She experiences migraines routinely around her menstrual cycle, specifically two days before and on the first day of her period. These migraines are relieved with Tylenol and caffeine. She is not on birth control and does not experience aura with her  "migraines.    Her social history includes living with her family and planning to move to [place] for medical school, where she will live with her cousin. She has been following an anti-inflammatory diet with her mother, which she felt improved her physical well-being.    Review of Systems   All other systems reviewed and are negative.    Objective   /83 (BP Location: Right arm, Patient Position: Sitting, BP Cuff Size: Adult)   Pulse 109   Temp 36.9 °C (98.4 °F) (Temporal)   Resp 14   Ht 1.727 m (5' 8\")   Wt 78.4 kg (172 lb 14.4 oz)   SpO2 98%   BMI 26.29 kg/m²     Physical Exam  Constitutional: Well developed, well nourished, alert and in no acute distress.  Head and Face: Normocephalic, atraumatic.  Eyes: Normal external exam. Pupils equally round and reactive to light with normal accommodation and extraocular movements intact.   ENT: External inspection of ears normal, tympanic membranes visualized and normal. Nasal mucosa, septum, and turbinates normal. Oral mucosa moist, oropharynx clear.   Neck: Supple, no lymphadenopathy or masses. Thyroid not enlarged, no palpable nodules.   Cardiovascular: Regular rate and rhythm, normal S1 and S2, no murmurs, gallops, or rubs. Radial pulses normal. No peripheral edema. No carotid bruits.   Pulmonary: No respiratory distress, lungs clear to auscultation bilaterally. No wheezes, rhonchi, rales.   Abdomen: Soft, nontender, nondistended, normal bowel sounds. No masses palpated.   Musculoskeletal: Gait normal. Muscle strength/tone normal of all 4 extremities. Normal range of motion of all extremities. +L4 vertebral spinous process TTP, muscle tension on lower lumbar region. Pain with flexion.   Skin: Warm, well perfused, normal skin turgor and color, no lesions or rashes noted.   Neurologic: Cranial nerves II-XII grossly intact. Deep tendon reflexes were 2+ and symmetric. Sensation normal bilaterally.   Psychiatric: Mood calm and affect normal.    Assessment/Plan "   Assessment & Plan  Low back pain  Chronic low back pain with tightness and soreness. Pain is located above the hip bones and along the spine, particularly around L4. Exacerbated by physical activity such as running and relieved by rest. Temporary relief from chiropractic care and physical therapy exercises.  Imaging is warranted to rule out structural issues. Consideration of muscle relaxants for symptomatic relief.  - Order lumbar spine x-ray  - Prescribe Robaxin (methocarbamol) for muscle relaxation, 5 pills to be used as needed. Please take at bed time and be cautious driving as it can cause drowsiness.   - Recommend purchasing a TENS unit for home use    Fatigue  Persistent fatigue despite adequate sleep duration. No issues with sleep onset or maintenance. Previous labs were normal. Differential diagnosis includes stress-related fatigue, sleep apnea, or other sleep disorders. Consideration of a sleep study due to the change in sleep quality and persistent tiredness.  - Order blood tests including TPO antibody, A1c, and ferritin level  - Recommend home sleep study using a ring device for two nights  General Health Maintenance  Routine health maintenance is up to date. Vaccinations are current. Eye and dental check-ups are maintained. Discussion of diet and lifestyle indicates a generally healthy pattern with occasional anti-inflammatory diet.  - Screen for TB and hepatitis B with titers    Follow up in 1 year for CPE

## 2025-06-24 ENCOUNTER — HOSPITAL ENCOUNTER (OUTPATIENT)
Dept: RADIOLOGY | Facility: CLINIC | Age: 25
Discharge: HOME | End: 2025-06-24
Payer: COMMERCIAL

## 2025-06-24 DIAGNOSIS — M54.50 CHRONIC MIDLINE LOW BACK PAIN WITHOUT SCIATICA: ICD-10-CM

## 2025-06-24 DIAGNOSIS — G89.29 CHRONIC MIDLINE LOW BACK PAIN WITHOUT SCIATICA: ICD-10-CM

## 2025-06-24 PROCEDURE — 72110 X-RAY EXAM L-2 SPINE 4/>VWS: CPT

## 2025-06-24 PROCEDURE — 72110 X-RAY EXAM L-2 SPINE 4/>VWS: CPT | Performed by: RADIOLOGY

## 2025-06-25 ENCOUNTER — TELEPHONE (OUTPATIENT)
Dept: PRIMARY CARE | Facility: CLINIC | Age: 25
End: 2025-06-25

## 2025-06-25 NOTE — RESULT ENCOUNTER NOTE
Xray shows mild leftward scoliosis, but no other abnormalities, no arthritis. Did you try the muscle relaxer and did it help? Scoliosis can cause chronic muscle tension/pain.

## 2025-06-26 LAB
25(OH)D3+25(OH)D2 SERPL-MCNC: 29 NG/ML (ref 30–100)
ERYTHROCYTE [DISTWIDTH] IN BLOOD BY AUTOMATED COUNT: 12.3 % (ref 11–15)
EST. AVERAGE GLUCOSE BLD GHB EST-MCNC: 97 MG/DL
EST. AVERAGE GLUCOSE BLD GHB EST-SCNC: 5.4 MMOL/L
FERRITIN SERPL-MCNC: 25 NG/ML (ref 16–154)
HBA1C MFR BLD: 5 %
HBV SURFACE AB SERPL IA-ACNC: <5 MIU/ML
HCT VFR BLD AUTO: 42.4 % (ref 35–45)
HGB BLD-MCNC: 13.6 G/DL (ref 11.7–15.5)
IGNF NEG CNTRL BLD: NORMAL
M TB IFN-G BLD-IMP: NEGATIVE
MCH RBC QN AUTO: 29.5 PG (ref 27–33)
MCHC RBC AUTO-ENTMCNC: 32.1 G/DL (ref 32–36)
MCV RBC AUTO: 92 FL (ref 80–100)
MITOGEN IGNF.SPOT COUNT BLD: NORMAL
PLATELET # BLD AUTO: 285 THOUSAND/UL (ref 140–400)
PMV BLD REES-ECKER: 10.4 FL (ref 7.5–12.5)
QUEST PANEL A SPOT COUNT: 0
QUEST PANEL B SPOT COUNT: 1
RBC # BLD AUTO: 4.61 MILLION/UL (ref 3.8–5.1)
THYROPEROXIDASE AB SERPL-ACNC: 2 IU/ML
TSH SERPL-ACNC: 1.42 MIU/L
VIT B12 SERPL-MCNC: 586 PG/ML (ref 200–1100)
WBC # BLD AUTO: 4.9 THOUSAND/UL (ref 3.8–10.8)

## 2025-06-30 ENCOUNTER — PATIENT MESSAGE (OUTPATIENT)
Dept: PRIMARY CARE | Facility: CLINIC | Age: 25
End: 2025-06-30

## 2025-06-30 ENCOUNTER — APPOINTMENT (OUTPATIENT)
Dept: PRIMARY CARE | Facility: CLINIC | Age: 25
End: 2025-06-30
Payer: COMMERCIAL

## 2025-06-30 PROCEDURE — 90471 IMMUNIZATION ADMIN: CPT | Performed by: FAMILY MEDICINE

## 2025-06-30 PROCEDURE — 90739 HEPB VACC 2/4 DOSE ADULT IM: CPT | Performed by: FAMILY MEDICINE

## 2025-07-08 DIAGNOSIS — G89.29 CHRONIC MIDLINE LOW BACK PAIN WITHOUT SCIATICA: ICD-10-CM

## 2025-07-08 DIAGNOSIS — M54.50 CHRONIC MIDLINE LOW BACK PAIN WITHOUT SCIATICA: ICD-10-CM

## 2025-07-08 RX ORDER — METHOCARBAMOL 500 MG/1
500 TABLET, FILM COATED ORAL NIGHTLY PRN
Qty: 30 TABLET | Refills: 0 | Status: SHIPPED | OUTPATIENT
Start: 2025-07-08 | End: 2025-08-07

## (undated) DEVICE — Device

## (undated) DEVICE — APPLICATOR, CHLORAPREP, W/ORANGE TINT, 26ML

## (undated) DEVICE — NEEDLE, HYPODERMIC, SAFETYGLIDE, SHIELDING, REGULAR WALL, REGULAR BEVEL, 22 G X 1.5 IN, BLACK HUB